# Patient Record
Sex: MALE | Race: WHITE | Employment: OTHER | URBAN - NONMETROPOLITAN AREA
[De-identification: names, ages, dates, MRNs, and addresses within clinical notes are randomized per-mention and may not be internally consistent; named-entity substitution may affect disease eponyms.]

---

## 2019-01-01 ENCOUNTER — TELEPHONE (OUTPATIENT)
Dept: CARDIOLOGY | Facility: CLINIC | Age: 79
End: 2019-01-01

## 2019-01-01 ENCOUNTER — OFFICE VISIT (OUTPATIENT)
Dept: CARDIOLOGY | Facility: CLINIC | Age: 79
End: 2019-01-01

## 2019-01-01 VITALS
HEART RATE: 63 BPM | WEIGHT: 216.8 LBS | BODY MASS INDEX: 32.11 KG/M2 | HEIGHT: 69 IN | DIASTOLIC BLOOD PRESSURE: 90 MMHG | OXYGEN SATURATION: 96 % | SYSTOLIC BLOOD PRESSURE: 164 MMHG

## 2019-01-01 DIAGNOSIS — I48.0 PAROXYSMAL ATRIAL FIBRILLATION (HCC): Primary | Chronic | ICD-10-CM

## 2019-01-01 DIAGNOSIS — I10 ESSENTIAL HYPERTENSION: Chronic | ICD-10-CM

## 2019-01-01 DIAGNOSIS — F17.201 SEVERE TOBACCO USE DISORDER, IN EARLY REMISSION: Chronic | ICD-10-CM

## 2019-01-01 PROCEDURE — 99214 OFFICE O/P EST MOD 30 MIN: CPT | Performed by: NURSE PRACTITIONER

## 2019-04-14 ENCOUNTER — OUTSIDE FACILITY SERVICE (OUTPATIENT)
Dept: CARDIOLOGY | Facility: CLINIC | Age: 79
End: 2019-04-14

## 2019-04-14 PROCEDURE — 93306 TTE W/DOPPLER COMPLETE: CPT | Performed by: INTERNAL MEDICINE

## 2019-04-15 ENCOUNTER — OUTSIDE FACILITY SERVICE (OUTPATIENT)
Dept: CARDIOLOGY | Facility: CLINIC | Age: 79
End: 2019-04-15

## 2019-04-15 PROCEDURE — 99233 SBSQ HOSP IP/OBS HIGH 50: CPT | Performed by: NURSE PRACTITIONER

## 2019-04-15 PROCEDURE — 93306 TTE W/DOPPLER COMPLETE: CPT | Performed by: INTERNAL MEDICINE

## 2019-04-17 DIAGNOSIS — I48.0 PAROXYSMAL ATRIAL FIBRILLATION (HCC): ICD-10-CM

## 2019-04-17 DIAGNOSIS — I50.33 ACUTE ON CHRONIC DIASTOLIC CONGESTIVE HEART FAILURE (HCC): ICD-10-CM

## 2019-04-17 DIAGNOSIS — I20.8 OTHER FORMS OF ANGINA PECTORIS (HCC): Primary | ICD-10-CM

## 2019-04-18 ENCOUNTER — TELEPHONE (OUTPATIENT)
Dept: CARDIOLOGY | Facility: CLINIC | Age: 79
End: 2019-04-18

## 2019-04-18 NOTE — TELEPHONE ENCOUNTER
Does he have any other readings? Clarify current home medications with patient and get a copy of discharge summary if available. Continue to monitor BP. If continues to run high will adjust medications. TX!

## 2019-04-18 NOTE — TELEPHONE ENCOUNTER
I told pt to keep a log and call and let me know how its running periodically.  He agreed.  I'm scanning the dc summary now, I went over the meds that were listed on the summary with him and he said that list is correct.

## 2019-05-01 ENCOUNTER — OUTSIDE FACILITY SERVICE (OUTPATIENT)
Dept: CARDIOLOGY | Facility: CLINIC | Age: 79
End: 2019-05-01

## 2019-05-01 PROCEDURE — 93018 CV STRESS TEST I&R ONLY: CPT | Performed by: INTERNAL MEDICINE

## 2019-05-01 PROCEDURE — 78452 HT MUSCLE IMAGE SPECT MULT: CPT | Performed by: INTERNAL MEDICINE

## 2019-05-03 DIAGNOSIS — I20.8 OTHER FORMS OF ANGINA PECTORIS (HCC): ICD-10-CM

## 2019-05-03 DIAGNOSIS — I50.33 ACUTE ON CHRONIC DIASTOLIC CONGESTIVE HEART FAILURE (HCC): ICD-10-CM

## 2019-05-03 DIAGNOSIS — I48.0 PAROXYSMAL ATRIAL FIBRILLATION (HCC): ICD-10-CM

## 2019-05-06 NOTE — PROGRESS NOTES
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 If not having any further chest discomfort, continue medical treatment. If continues to have chest discomfort, will adjust medications/consider a cardiac catheterization.

## 2019-05-13 ENCOUNTER — OFFICE VISIT (OUTPATIENT)
Dept: CARDIOLOGY | Facility: CLINIC | Age: 79
End: 2019-05-13

## 2019-05-13 VITALS
BODY MASS INDEX: 32.05 KG/M2 | OXYGEN SATURATION: 96 % | SYSTOLIC BLOOD PRESSURE: 118 MMHG | HEIGHT: 69 IN | HEART RATE: 58 BPM | DIASTOLIC BLOOD PRESSURE: 80 MMHG | WEIGHT: 216.4 LBS

## 2019-05-13 DIAGNOSIS — R94.39 ABNORMAL NUCLEAR STRESS TEST: Chronic | ICD-10-CM

## 2019-05-13 DIAGNOSIS — I10 ESSENTIAL HYPERTENSION: Chronic | ICD-10-CM

## 2019-05-13 DIAGNOSIS — Z59.9 FINANCIAL DIFFICULTIES: ICD-10-CM

## 2019-05-13 DIAGNOSIS — Z72.0 TOBACCO ABUSE: Chronic | ICD-10-CM

## 2019-05-13 DIAGNOSIS — I48.0 PAROXYSMAL ATRIAL FIBRILLATION (HCC): Primary | Chronic | ICD-10-CM

## 2019-05-13 PROBLEM — I48.91 ATRIAL FIBRILLATION (HCC): Chronic | Status: ACTIVE | Noted: 2019-05-13

## 2019-05-13 PROCEDURE — 93000 ELECTROCARDIOGRAM COMPLETE: CPT | Performed by: NURSE PRACTITIONER

## 2019-05-13 PROCEDURE — 99214 OFFICE O/P EST MOD 30 MIN: CPT | Performed by: NURSE PRACTITIONER

## 2019-05-13 RX ORDER — BENAZEPRIL HYDROCHLORIDE 10 MG/1
20 TABLET ORAL 2 TIMES DAILY
COMMUNITY

## 2019-05-13 RX ORDER — ESCITALOPRAM OXALATE 20 MG/1
20 TABLET ORAL DAILY
COMMUNITY

## 2019-05-13 RX ORDER — BUDESONIDE AND FORMOTEROL FUMARATE DIHYDRATE 160; 4.5 UG/1; UG/1
2 AEROSOL RESPIRATORY (INHALATION)
COMMUNITY

## 2019-05-13 RX ORDER — DILTIAZEM HYDROCHLORIDE 240 MG/1
240 CAPSULE, COATED, EXTENDED RELEASE ORAL DAILY
COMMUNITY
End: 2019-06-04 | Stop reason: SDUPTHER

## 2019-05-13 RX ORDER — VENLAFAXINE HYDROCHLORIDE 150 MG/1
150 CAPSULE, EXTENDED RELEASE ORAL DAILY
COMMUNITY

## 2019-05-13 SDOH — ECONOMIC STABILITY - INCOME SECURITY: PROBLEM RELATED TO HOUSING AND ECONOMIC CIRCUMSTANCES, UNSPECIFIED: Z59.9

## 2019-05-13 NOTE — ASSESSMENT & PLAN NOTE
Back in afib. Rate not controlled with exertion. Having constipation with diltiazem. Anticoagulated with Xarelto since 4/15/19. Will schedule cardioversion and discuss medications with Dr. Madrid.

## 2019-05-13 NOTE — PROGRESS NOTES
Subjective:     Encounter Date:05/13/2019    Chief Complaint:    Patient ID: Jim Villela is a 78 y.o. male here today for 2 week hospital cardiac follow-up.    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 (History)        History:   Past Medical History:   Diagnosis Date   • Asthma    • Atrial fibrillation (CMS/HCC)    • COPD (chronic obstructive pulmonary disease) (CMS/HCC)    • Hypertension      Past Surgical History:   Procedure Laterality Date   • HERNIA REPAIR       Social History     Socioeconomic History   • Marital status:      Spouse name: Not on file   • Number of children: Not on file   • Years of education: Not on file   • Highest education level: Not on file   Tobacco Use   • Smoking status: Current Every Day Smoker     Packs/day: 0.25     Years: 62.00     Pack years: 15.50     Types: Cigarettes     Start date: 1957   • Smokeless tobacco: Never Used   • Tobacco comment: has cut back, has never quit, up to 2 to 2.5 ppd, averaged 1 to 1.5 ppd   Substance and Sexual Activity   • Alcohol use: No     Frequency: Never   • Drug use: No   • Sexual activity: Defer     History reviewed. No pertinent family history.    Outpatient Medications Marked as Taking for the 5/13/19 encounter (Office Visit) with Huong Romero APRN   Medication Sig Dispense Refill   • benazepril (LOTENSIN) 10 MG tablet Take 10 mg by mouth Daily.  "    • budesonide-formoterol (SYMBICORT) 160-4.5 MCG/ACT inhaler Inhale 2 puffs 2 (Two) Times a Day.     • diltiaZEM CD (CARDIZEM CD) 240 MG 24 hr capsule Take 240 mg by mouth Daily.     • escitalopram (LEXAPRO) 20 MG tablet Take 20 mg by mouth Daily.     • rivaroxaban (XARELTO) 20 MG tablet Take 20 mg by mouth Daily.     • tiotropium (SPIRIVA) 18 MCG per inhalation capsule Place 1 capsule into inhaler and inhale Daily.     • venlafaxine XR (EFFEXOR-XR) 150 MG 24 hr capsule Take 150 mg by mouth Daily.       Review of Systems:  Review of Systems   Constitution: Positive for malaise/fatigue. Negative for chills, decreased appetite, fever and weakness.   HENT: Positive for congestion. Negative for nosebleeds.    Eyes: Negative for blurred vision and double vision.   Cardiovascular: Positive for dyspnea on exertion, leg swelling (chronic, hasn't worsened) and palpitations (feels like a skipped beat - has had his whole life, didn't know when he was in afib). Negative for chest pain, irregular heartbeat, near-syncope and syncope.   Respiratory: Positive for cough, shortness of breath and sputum production (white).    Hematologic/Lymphatic: Does not bruise/bleed easily.   Skin: Negative for dry skin, itching and rash.   Musculoskeletal: Positive for arthritis, joint pain and neck pain. Negative for back pain, falls, joint swelling, muscle cramps and muscle weakness.   Gastrointestinal: Positive for constipation (since starting diltiazem). Negative for abdominal pain, diarrhea, nausea and vomiting.   Neurological: Positive for loss of balance. Negative for dizziness, headaches and light-headedness.   Psychiatric/Behavioral: Positive for depression. The patient does not have insomnia and is not nervous/anxious.           Objective:   /80 (BP Location: Left arm, Patient Position: Sitting, Cuff Size: Adult)   Pulse 58   Ht 175.3 cm (69\")   Wt 98.2 kg (216 lb 6.4 oz)   SpO2 96%   BMI 31.96 kg/m²   Wt Readings from " Last 3 Encounters:   05/13/19 98.2 kg (216 lb 6.4 oz)       Physical Exam   Constitutional: He is oriented to person, place, and time. He appears well-developed and well-nourished.   Eyes: No scleral icterus.   Neck: No JVD present.   Cardiovascular: Normal rate, normal heart sounds and intact distal pulses. An irregularly irregular rhythm present. Exam reveals no gallop and no friction rub.   No murmur heard.  Pulmonary/Chest: Effort normal. He has decreased breath sounds. He has wheezes in the right upper field. He has no rales.   Barrel chested - increased AP diameter   Musculoskeletal: He exhibits no edema.   Neurological: He is alert and oriented to person, place, and time.   Skin: Skin is warm and dry.   Psychiatric: He has a normal mood and affect.   Vitals reviewed.    Lab/Diagnostics Review:   5/3/2019 Lexiscan sestamibi fixed inferior defect possible old with no definite evidence of with mild hypokinesis of the inferior and inferolateral wall.    4/16/2019 lipid panel total cholesterol 164 triglycerides 120 HDL 33     4/15/2019 echocardiogram EF 55 to 60%, grade 1 diastolic dysfunction, normal RV size and function, no significant valvular dysfunction    4/15/2019 EKG sinus rhythm sinus arrhythmia with short CO, left anterior fascicular block,  ms    4/15/2019  CBC WBC 10,400 hemoglobin 14.7 hematocrit 46% platelets 211,000  BMP sodium 141 potassium 4.2 chloride 108 CO2 22.8 BUN 23 creatinine 1.46 calcium 8.4 glucose 112  TSH 4.427      ECG 12 Lead  Date/Time: 5/13/2019 11:01 AM  Performed by: Huong Romero APRN  Authorized by: Huong Romero APRN   Comparison: compared with previous ECG from 4/15/2019  Comparison to previous ECG: Atrial fibrillation with RVR has replaced sinus rhythm with sinus arrhythmia  Rhythm: atrial fibrillation  Rate: tachycardic  BPM: 144  Q waves: V4 and V5    QRS axis: left  Other findings comments: RSR' or QR pattern in V1 suggestive of  RVCD    Clinical impression: abnormal EKG                Assessment/Plan:         Problem List Items Addressed This Visit        Cardiovascular and Mediastinum    Paroxysmal atrial fibrillation (CMS/HCC) - Primary (Chronic)    Current Assessment & Plan     Back in afib. Rate not controlled with exertion. Having constipation with diltiazem. Anticoagulated with Xarelto since 4/15/19. Will schedule cardioversion and discuss medications with Dr. Madrid.           Relevant Medications    diltiaZEM CD (CARDIZEM CD) 240 MG 24 hr capsule    Other Relevant Orders    ECG 12 Lead    Hypertension (Chronic)    Current Assessment & Plan     Well controlled with medications but having constipation with diltiazem.          Relevant Medications    diltiaZEM CD (CARDIZEM CD) 240 MG 24 hr capsule    benazepril (LOTENSIN) 10 MG tablet    Abnormal nuclear stress test (Chronic)    Overview     Fixed inferior defect and inferior and inferolateral hypokinesis per 5/1/19 Lexiscan [facility].         Current Assessment & Plan     Stable without angina. On Xarelto for afib. Fair lipid control per 4/2019 labs. Add statin. Treat medically.            Other    Tobacco abuse (Chronic)    Current Assessment & Plan     Encouraged smoking cessation. Has cut back to 1/4 ppd.          Financial difficulties    Current Assessment & Plan     Encouraged him to apply for food stamps, etc. He does not know how to apply. He is ok with his grandson helping him apply and check into other resources. His daughter lives in [place]. He eats only one meal a day at a local soup kitchen.              Return in about 3 months (around 8/13/2019) for Recheck.           Huong Romero, APRN, ACNP-BC, CHFN-BC ;

## 2019-05-13 NOTE — ASSESSMENT & PLAN NOTE
Encouraged him to apply for food stamps, etc. He does not know how to apply. He is ok with his grandson helping him apply and check into other resources. His daughter lives in [place]. He eats only one meal a day at a local soup kitchen.

## 2019-05-13 NOTE — ASSESSMENT & PLAN NOTE
Stable without angina. On Xarelto for afib. Fair lipid control per 4/2019 labs. Add statin. Treat medically.

## 2019-05-14 ENCOUNTER — TELEPHONE (OUTPATIENT)
Dept: CARDIOLOGY | Facility: CLINIC | Age: 79
End: 2019-05-14

## 2019-05-14 NOTE — TELEPHONE ENCOUNTER
Pt daughter Alexa called to speak with you regarding pt's appt yesterday.  She is on his CHIQUI for you to speak with also, Amirah Lynne, just goes by Alexa.

## 2019-05-16 DIAGNOSIS — I48.0 PAROXYSMAL ATRIAL FIBRILLATION (HCC): Primary | Chronic | ICD-10-CM

## 2019-05-16 RX ORDER — AMIODARONE HYDROCHLORIDE 200 MG/1
200 TABLET ORAL DAILY
Qty: 30 TABLET | Refills: 11 | Status: SHIPPED | OUTPATIENT
Start: 2019-05-16 | End: 2020-01-01

## 2019-05-16 NOTE — TELEPHONE ENCOUNTER
CALLED PT INFORMED HIM OF THE NEW MEDICATION AND THAT HE NEEDED TO COME TO OFFICE IN 2 WEEKS TO HAVE AN EKG DONE PT VOICED UNDERSTANDING

## 2019-05-16 NOTE — TELEPHONE ENCOUNTER
Returned daughter's phone call. Pt went to apply for assistance yesterday, and she is sending him [employer] gift cards for groceries. She will be out of school in June and will be able to come down to provide transportation if he requires cardioversion. I spoke to the  at the hospital this morning, and she recommended he go to the [facility] too.     Please notify patient to start amiodarone 200 mg daily (RX sent) in addition to his other medications and come by the office in 2 weeks for an EKG only to see if needs cardioversion. We will need to check labs (TSH, HFP) and PFTs yearly to make sure he tolerates amiodarone.     Daughter asked that we call her with any problems or concerns. She lives in [place] but he is estranged from family who live here. TX!

## 2019-05-28 ENCOUNTER — TELEPHONE (OUTPATIENT)
Dept: CARDIOLOGY | Facility: CLINIC | Age: 79
End: 2019-05-28

## 2019-05-28 ENCOUNTER — CLINICAL SUPPORT (OUTPATIENT)
Dept: CARDIOLOGY | Facility: CLINIC | Age: 79
End: 2019-05-28

## 2019-05-28 DIAGNOSIS — I48.0 PAROXYSMAL ATRIAL FIBRILLATION (HCC): Primary | Chronic | ICD-10-CM

## 2019-05-28 PROCEDURE — 93000 ELECTROCARDIOGRAM COMPLETE: CPT | Performed by: NURSE PRACTITIONER

## 2019-05-28 NOTE — TELEPHONE ENCOUNTER
Patient in office this morning for EKG only.    Stated that over the weekend he had one episode where he got very short of breath.  Only lasted for a few minutes and went away.  Just wanted you to see EKG and see what you suggested he do if anything.  No further episodes.  In office:  /78  HR 86  O2 94%

## 2019-05-28 NOTE — TELEPHONE ENCOUNTER
Let him know that he is back in sinus rhythm and does not need a cardioversion at this time. Not sure why he had the short episode of SOA over the weekend. Have him continue present therapy and call if worsens. TX!

## 2019-05-28 NOTE — PROGRESS NOTES
ECG 12 Lead  Date/Time: 5/28/2019 10:51 AM  Performed by: Huong Romero APRN  Authorized by: Huong Romero APRN   Comparison: compared with previous ECG from 5/13/2019  Comparison to previous ECG: Sinus has replaced atrial fibrillation and HR has decreased from 144 bpm, possible previous anterior infarct previously cited  Rhythm: sinus rhythm  Rate: normal  BPM: 86  Conduction: incomplete right bundle branch block  QRS axis: left    Clinical impression: abnormal EKG

## 2019-06-02 ENCOUNTER — OUTSIDE FACILITY SERVICE (OUTPATIENT)
Dept: CARDIOLOGY | Facility: CLINIC | Age: 79
End: 2019-06-02

## 2019-06-02 PROCEDURE — 93010 ELECTROCARDIOGRAM REPORT: CPT | Performed by: INTERNAL MEDICINE

## 2019-06-03 ENCOUNTER — OUTSIDE FACILITY SERVICE (OUTPATIENT)
Dept: CARDIOLOGY | Facility: CLINIC | Age: 79
End: 2019-06-03

## 2019-06-03 PROCEDURE — 99222 1ST HOSP IP/OBS MODERATE 55: CPT | Performed by: INTERNAL MEDICINE

## 2019-06-04 ENCOUNTER — TELEPHONE (OUTPATIENT)
Dept: CARDIOLOGY | Facility: CLINIC | Age: 79
End: 2019-06-04

## 2019-06-04 DIAGNOSIS — I48.0 PAROXYSMAL ATRIAL FIBRILLATION (HCC): Primary | ICD-10-CM

## 2019-06-04 RX ORDER — DILTIAZEM HYDROCHLORIDE 360 MG/1
360 CAPSULE, EXTENDED RELEASE ORAL DAILY
Qty: 30 CAPSULE | Refills: 11 | Status: SHIPPED | OUTPATIENT
Start: 2019-06-04 | End: 2020-01-01 | Stop reason: SDUPTHER

## 2019-06-04 NOTE — TELEPHONE ENCOUNTER
Yes, I increased his diltiazem to 360 mg. I remembered his script this morning. Just sent script. Have him keep his 6/17/19 follow-up. Referred to EP for possible ablation. TX!

## 2019-06-04 NOTE — TELEPHONE ENCOUNTER
Did you change his meds while in [facility]?  He said that you had changed something and were going to send in to [facility] for him, but [facility] told him that they do not have anything.

## 2019-06-17 ENCOUNTER — OFFICE VISIT (OUTPATIENT)
Dept: CARDIOLOGY | Facility: CLINIC | Age: 79
End: 2019-06-17

## 2019-06-17 ENCOUNTER — TELEPHONE (OUTPATIENT)
Dept: CARDIOLOGY | Facility: CLINIC | Age: 79
End: 2019-06-17

## 2019-06-17 VITALS
HEART RATE: 86 BPM | BODY MASS INDEX: 32.38 KG/M2 | DIASTOLIC BLOOD PRESSURE: 92 MMHG | HEIGHT: 69 IN | SYSTOLIC BLOOD PRESSURE: 148 MMHG | WEIGHT: 218.6 LBS | OXYGEN SATURATION: 95 %

## 2019-06-17 DIAGNOSIS — I10 ESSENTIAL HYPERTENSION: Chronic | ICD-10-CM

## 2019-06-17 DIAGNOSIS — Z59.9 FINANCIAL DIFFICULTIES: ICD-10-CM

## 2019-06-17 DIAGNOSIS — I48.0 PAROXYSMAL ATRIAL FIBRILLATION (HCC): Primary | Chronic | ICD-10-CM

## 2019-06-17 DIAGNOSIS — Z72.0 TOBACCO ABUSE: Chronic | ICD-10-CM

## 2019-06-17 DIAGNOSIS — R94.39 ABNORMAL NUCLEAR STRESS TEST: Chronic | ICD-10-CM

## 2019-06-17 PROCEDURE — 99214 OFFICE O/P EST MOD 30 MIN: CPT | Performed by: NURSE PRACTITIONER

## 2019-06-17 PROCEDURE — 93000 ELECTROCARDIOGRAM COMPLETE: CPT | Performed by: NURSE PRACTITIONER

## 2019-06-17 RX ORDER — BISACODYL 5 MG/1
5 TABLET, DELAYED RELEASE ORAL DAILY PRN
COMMUNITY

## 2019-06-17 SDOH — ECONOMIC STABILITY - INCOME SECURITY: PROBLEM RELATED TO HOUSING AND ECONOMIC CIRCUMSTANCES, UNSPECIFIED: Z59.9

## 2019-06-17 NOTE — ASSESSMENT & PLAN NOTE
Restart anticoagulation as soon as possible. Awaiting evaluation by Dr. Frias. Keep appt to see Dr. Watters 8/22/2019. Continue amiodarone and diltiazem for now but monitor QTc. Call if any dizziness, near syncope, or syncope.

## 2019-06-17 NOTE — PROGRESS NOTES
Subjective:     Encounter Date:06/17/2019    Chief Complaint:    Patient ID: Jim Villela is a 79 y.o. male here today for cardiac follow-up.    HPI     Atrial Fibrillation      Additional comments: 2 week [facility] hosp f/u with rapid ventricular response, diltiazem increased, feels better. Was having severe constipation and blood in his stool. Dr. Soto stopped Xarelto (he thinks last Monday). Seeing Dr. Frias 6/25/19 to see if needs colonoscopy. Has an appointment to see Dr. Watters 8/22/2019.               Hypertension      Additional comments: high for years          Last edited by Huong Romero APRN on 6/17/2019  2:36 PM. (History)        History:   Past Medical History:   Diagnosis Date   • Asthma    • Atrial fibrillation (CMS/HCC)    • COPD (chronic obstructive pulmonary disease) (CMS/HCC)    • Hypertension    • Melena      Past Surgical History:   Procedure Laterality Date   • HERNIA REPAIR       Social History     Socioeconomic History   • Marital status:      Spouse name: Not on file   • Number of children: Not on file   • Years of education: Not on file   • Highest education level: Not on file   Tobacco Use   • Smoking status: Current Every Day Smoker     Packs/day: 2.00     Years: 62.00     Pack years: 124.00     Types: Cigarettes     Start date: 1957   • Smokeless tobacco: Never Used   • Tobacco comment: has cut back to 1/4-1/2 ppd, has never quit, up to 2 to 2.5 ppd, averaged 1 to 1.5 ppd   Substance and Sexual Activity   • Alcohol use: No     Frequency: Never   • Drug use: No   • Sexual activity: Defer     History reviewed. No pertinent family history.    Outpatient Medications Marked as Taking for the 6/17/19 encounter (Office Visit) with Huong Romero APRN   Medication Sig Dispense Refill   • amiodarone (PACERONE) 200 MG tablet Take 1 tablet by mouth Daily. 30 tablet 11   • benazepril (LOTENSIN) 10 MG tablet Take 10 mg by mouth Daily.     • bisacodyl (DULCOLAX) 5 MG EC tablet  Take 5 mg by mouth Daily As Needed for Constipation.     • budesonide-formoterol (SYMBICORT) 160-4.5 MCG/ACT inhaler Inhale 2 puffs 2 (Two) Times a Day.     • diltiaZEM CD (CARDIZEM CD) 360 MG 24 hr capsule Take 1 capsule by mouth Daily. 30 capsule 11   • escitalopram (LEXAPRO) 20 MG tablet Take 20 mg by mouth Daily.     • tiotropium (SPIRIVA) 18 MCG per inhalation capsule Place 1 capsule into inhaler and inhale Daily.     • venlafaxine XR (EFFEXOR-XR) 150 MG 24 hr capsule Take 150 mg by mouth Daily.     Didn't bring medications or list today. Called back to confirm he is taking benazepril 10 mg daily for BP.     Review of Systems:  Review of Systems   Constitution: Positive for malaise/fatigue. Negative for chills, decreased appetite, fever and weakness.   HENT: Positive for congestion and hearing loss. Negative for nosebleeds.    Eyes: Negative for blurred vision and double vision.   Cardiovascular: Positive for dyspnea on exertion, leg swelling (chronic, hasn't worsened) and palpitations (feels like a skipped beat - has had his whole life, didn't know when he was in afib). Negative for chest pain, irregular heartbeat, near-syncope and syncope.   Respiratory: Positive for cough, shortness of breath and sputum production (white).    Hematologic/Lymphatic: Does not bruise/bleed easily.   Skin: Negative for dry skin, itching and rash.   Musculoskeletal: Positive for arthritis, joint pain and neck pain. Negative for back pain, falls, joint swelling, muscle cramps and muscle weakness.   Gastrointestinal: Positive for constipation (since starting diltiazem) and melena. Negative for abdominal pain, diarrhea, heartburn, nausea and vomiting.   Neurological: Positive for loss of balance. Negative for dizziness, headaches and light-headedness.   Psychiatric/Behavioral: Positive for depression. The patient does not have insomnia and is not nervous/anxious.           Objective:   /92 (BP Location: Left arm, Patient  "Position: Sitting, Cuff Size: Adult)   Pulse 86   Ht 175.3 cm (69\")   Wt 99.2 kg (218 lb 9.6 oz)   SpO2 95%   BMI 32.28 kg/m²   Wt Readings from Last 3 Encounters:   06/17/19 99.2 kg (218 lb 9.6 oz)   05/13/19 98.2 kg (216 lb 6.4 oz)       Physical Exam   Constitutional: He is oriented to person, place, and time. He appears well-developed and well-nourished.   Eyes: No scleral icterus.   Neck: No JVD present.   Cardiovascular: Normal rate, normal heart sounds and intact distal pulses. An irregularly irregular rhythm present. Exam reveals no gallop and no friction rub.   No murmur heard.  Pulmonary/Chest: Effort normal. He has decreased breath sounds. He has wheezes (cleared with coughing) in the right upper field. He has no rales.   Barrel chested - increased AP diameter   Musculoskeletal: He exhibits no edema.   Neurological: He is alert and oriented to person, place, and time.   Skin: Skin is warm and dry.   Psychiatric: He has a normal mood and affect.   Vitals reviewed.    Lab/Diagnostics Review:   6/3/2019   CBC WBC 11,300 hemoglobin 13.7 hematocrit 43.6% platelets 229,000   BMP sodium 141 potassium 4.7 chloride 106 CO2 31.5 BUN 27 creatinine 1.76 calcium 9.1 glucose 108  Troponin less than 0.017    6/2/2019 EKG atrial fibrillation 175 bpm, incomplete right bundle branch block, left anterior fascicular block, significantly changed from previous EKG as read by Dr. Mercy ORDAZ      5/3/2019 Lexiscan sestamibi fixed inferior defect possible old with no definite evidence of with mild hypokinesis of the inferior and inferolateral wall.     4/16/2019 lipid panel total cholesterol 164 triglycerides 120 HDL 33      4/15/2019 echocardiogram EF 55 to 60%, grade 1 diastolic dysfunction, normal RV size and function, no significant valvular dysfunction     4/15/2019 EKG sinus rhythm sinus arrhythmia with short AL, left anterior fascicular block,  ms     4/15/2019  CBC WBC 10,400 hemoglobin 14.7 hematocrit " 46% platelets 211,000  BMP sodium 141 potassium 4.2 chloride 108 CO2 22.8 BUN 23 creatinine 1.46 calcium 8.4 glucose 112  TSH 4.427      ECG 12 Lead  Date/Time: 5/13/2019 11:01 AM  Performed by: Huong Romero APRN  Authorized by: Huong Romero APRN   Comparison: compared with previous ECG from 4/15/2019  Comparison to previous ECG: Atrial fibrillation with RVR has replaced sinus rhythm with sinus arrhythmia  Rhythm: atrial fibrillation  Rate: tachycardic  BPM: 144  Q waves: V4 and V5     QRS axis: left  Other findings comments: RSR' or QR pattern in V1 suggestive of RVCD    Clinical impression: abnormal EKG      ECG 12 Lead  Date/Time: 6/17/2019 2:38 PM  Performed by: Huong Romero APRN  Authorized by: Huong Romero APRN   Comparison: compared with previous ECG from 6/2/2019  Comparison to previous ECG: Sinus has replaced atrial fibrillation with RVR, QTc increased from 433 ms on 5/28/19  Rhythm: sinus rhythm  Ectopy: unifocal PVCs and infrequent PVCs  Rate: normal  BPM: 85  Conduction: incomplete right bundle branch block  Other findings: prolonged QTc interval  Other findings comments: 506 ms    Clinical impression: abnormal EKG                Assessment/Plan:         Problem List Items Addressed This Visit        Cardiovascular and Mediastinum    Paroxysmal atrial fibrillation (CMS/HCC) - Primary (Chronic)    Current Assessment & Plan     Restart anticoagulation as soon as possible. Awaiting evaluation by Dr. Frias. Keep appt to see Dr. Watters 8/22/2019. Continue amiodarone and diltiazem for now but monitor QTc. Call if any dizziness, near syncope, or syncope.           Relevant Orders    ECG 12 Lead    Hypertension (Chronic)    Current Assessment & Plan     Encouraged him to check BP at home regularly. Goal less 130/80. Try to avoid processed foods high in sodium. May need to increase benazepril.          Abnormal nuclear stress test (Chronic)    Overview     Fixed inferior defect  and inferior and inferolateral hypokinesis per 5/1/19 Lexiscan [facility].         Current Assessment & Plan     Continue medical management. Consider adding aspirin if Xarelto not restarted after melena evaluated.             Other    Tobacco abuse (Chronic)    Current Assessment & Plan     Encouraged smoking cessation. He has never been able to quit before.          Financial difficulties        Return in about 1 month (around 7/17/2019) for Recheck.           Huong Romero, APRN, ACNP-BC, CHFN-BC

## 2019-06-17 NOTE — ASSESSMENT & PLAN NOTE
Encouraged him to check BP at home regularly. Goal less 130/80. Try to avoid processed foods high in sodium. May need to increase benazepril.

## 2019-06-17 NOTE — ASSESSMENT & PLAN NOTE
Continue medical management. Consider adding aspirin if Xarelto not restarted after melena evaluated.

## 2019-06-24 ENCOUNTER — TELEPHONE (OUTPATIENT)
Dept: CARDIOLOGY | Facility: CLINIC | Age: 79
End: 2019-06-24

## 2019-06-24 NOTE — TELEPHONE ENCOUNTER
Pt called and stated his BP and HR readings are causing him some concern.    Saturday (6/22/2019) was /87 HR 56  Sunday (6/23/2019) was /81  HR 48  Today (6/24/2019)was /88  HR 47.    He would like to know if these are okay or if he needs to be checked out.      Thanks,  Eloy

## 2019-06-28 ENCOUNTER — TELEPHONE (OUTPATIENT)
Dept: CARDIOLOGY | Facility: CLINIC | Age: 79
End: 2019-06-28

## 2019-06-28 NOTE — TELEPHONE ENCOUNTER
----- Message from Huong Rajan PA-C sent at 6/25/2019 10:13 PM CDT -----  Please call and see if pt is having symptoms in regard to high bp or low HR.  Advise him to check twice daily.  Please let me know what he says.  Thanks!      Lt message for pt to call office back

## 2019-07-01 ENCOUNTER — TELEPHONE (OUTPATIENT)
Dept: CARDIOLOGY | Facility: CLINIC | Age: 79
End: 2019-07-01

## 2019-07-01 NOTE — TELEPHONE ENCOUNTER
CALLED SPOKE TO PT HE STATED HE HAS BEEN STAYING INSIDE AND IS NOT HAVING DIZZINESS AND SOB ONLY IF HE GETS OUT IN THE HEAT HE ALSO STATED HIS BP IS RUNNING HIGH

## 2019-07-01 NOTE — TELEPHONE ENCOUNTER
----- Message from Huong Rajan PA-C sent at 7/1/2019  1:29 PM CDT -----  Has he kept BP log?  If so, what has it been running?    CONTACTED PT HE STATED HIS READINGS WERE  06/26/2019   146/90  HEART RATE 52  06/27/2019  146/90  HEART RATE 53  136/78  HEART RATE 55  06/28/2019  157/92  HEART RATE 56  07/01/2019  152/98  HEART RATE 50

## 2019-07-01 NOTE — TELEPHONE ENCOUNTER
----- Message from Huong Rajan PA-C sent at 6/28/2019  6:06 PM CDT -----  Please call pt on Monday and if he is still having significant SOB and dizziness, he can schedule OV with JG or with PCP if needed to be seen sooner.      PT IS MAKING AN APPT WITH PCP

## 2019-07-02 ENCOUNTER — TELEPHONE (OUTPATIENT)
Dept: CARDIOLOGY | Facility: CLINIC | Age: 79
End: 2019-07-02

## 2019-07-02 NOTE — TELEPHONE ENCOUNTER
----- Message from Huong Rajan PA-C sent at 7/2/2019  2:54 PM CDT -----  Increase benazapril to 20 mg daily.  Continue to monitor BP twice daily and document.  Come to office to get EKG.  You can send to me for review.  Thanks!    CALLED INFORMED PT TO INCREASE HIS BENAZAPTIL TO 20 MG A DAY AND TO CONTINUE TO KEEP A BP DIARY AND TO COME INTO OFFICE AND HAVE AN EKG DONE PT VOICED UNDERSTANDING

## 2019-07-03 ENCOUNTER — TELEPHONE (OUTPATIENT)
Dept: CARDIOLOGY | Facility: CLINIC | Age: 79
End: 2019-07-03

## 2019-07-03 DIAGNOSIS — I49.1 ATRIAL ECTOPY: Primary | ICD-10-CM

## 2019-07-03 DIAGNOSIS — R42 DIZZINESS: ICD-10-CM

## 2019-07-03 RX ORDER — NEBIVOLOL 2.5 MG/1
2.5 TABLET ORAL DAILY
Qty: 30 TABLET | Refills: 11 | Status: SHIPPED | OUTPATIENT
Start: 2019-07-03 | End: 2019-07-12

## 2019-07-03 NOTE — TELEPHONE ENCOUNTER
----- Message from Huong Rajan PA-C sent at 7/3/2019  9:50 AM CDT -----  Start bystolic 2.5 mg qd.  Sent to pharmacy.  Pt should not take if HR <=50 bpm.  Continue to monitor BP and HR twice daily and document.  14 day zio patch.  Orders placed.  Thanks!      CALLED PT INFORMED HIM THAT BYSTOLIC WAS SENT TO PHARMACY FOR HIM TO START AND THAT IF HR WAS LES THAN 50 DO NOT TAKE ALSO HE NEEDED TO GET A HOLTER MONITOR PLACED HE WILL COME IN OFFICE TO HAVE THIS DONE

## 2019-07-09 ENCOUNTER — TELEPHONE (OUTPATIENT)
Dept: CARDIOLOGY | Facility: CLINIC | Age: 79
End: 2019-07-09

## 2019-07-09 DIAGNOSIS — I48.0 PAROXYSMAL ATRIAL FIBRILLATION (HCC): Primary | Chronic | ICD-10-CM

## 2019-07-12 ENCOUNTER — NURSE TRIAGE (OUTPATIENT)
Dept: CALL CENTER | Facility: HOSPITAL | Age: 79
End: 2019-07-12

## 2019-07-12 NOTE — TELEPHONE ENCOUNTER
"Caller states he is confused about what he should start taking. This nurse and another nurse went over with patient. He states he's confused about what he should be taking. We reviewed per his recent office visit note and we do believe we cleared it up for him. He was advised to contact office on Monday and confirm with what we are seeing in his recent note.     Reason for Disposition  • Caller has medication question only, adult not sick, and triager answers question    Additional Information  • Negative: Drug overdose and nurse unable to answer question  • Negative: Caller requesting information not related to medicine  • Negative: Caller requesting a prescription for Strep throat and has a positive culture result  • Negative: Rash while taking a medication or within 3 days of stopping it  • Negative: Immunization reaction suspected  • Negative: [1] Asthma and [2] having symptoms of asthma (cough, wheezing, etc)  • Negative: MORE THAN A DOUBLE DOSE of a prescription or over-the-counter (OTC) drug  • Negative: [1] DOUBLE DOSE (an extra dose or lesser amount) of over-the-counter (OTC) drug AND [2] any symptoms (e.g., dizziness, nausea, pain, sleepiness)  • Negative: [1] DOUBLE DOSE (an extra dose or lesser amount) of prescription drug AND [2] any symptoms (e.g., dizziness, nausea, pain, sleepiness)  • Negative: Took another person's prescription drug  • Negative: [1] DOUBLE DOSE (an extra dose or lesser amount) of prescription drug AND [2] NO symptoms (Exception: a double dose of antibiotics)  • Negative: Diabetes drug error or overdose (e.g., insulin or extra dose)  • Negative: [1] Request for URGENT new prescription or refill of \"essential\" medication (i.e., likelihood of harm to patient if not taken) AND [2] triager unable to fill per unit policy  • Negative: [1] Prescription not at pharmacy AND [2] was prescribed today by PCP  • Negative: Pharmacy calling with prescription questions and triager unable to answer " "question  • Negative: Caller has URGENT medication question about med that PCP prescribed and triager unable to answer question  • Negative: Caller has NON-URGENT medication question about med that PCP prescribed and triager unable to answer question  • Negative: Caller requesting a NON-URGENT new prescription or refill and triager unable to refill per unit policy  • Negative: Caller has medication question about med not prescribed by PCP and triager unable to answer question (e.g., compatibility with other med, storage)  • Negative: [1] DOUBLE DOSE (an extra dose or lesser amount) of over-the-counter (OTC) drug AND [2] NO symptoms  • Negative: [1] DOUBLE DOSE (an extra dose or lesser amount) of antibiotic drug AND [2] NO symptoms    Answer Assessment - Initial Assessment Questions  1. SYMPTOMS: \"Do you have any symptoms?\"      Denies   2. SEVERITY: If symptoms are present, ask \"Are they mild, moderate or severe?\"      Denies    Protocols used: MEDICATION QUESTION CALL-ADULT-      "

## 2019-07-12 NOTE — TELEPHONE ENCOUNTER
Ordered metoprolol tartrate 25 mg BID to replace Bystolic due to cost (may provide better rate control as well). Have pt call with any side effects or questions. Keep 7/23/19 f/u. TX!

## 2019-07-12 NOTE — TELEPHONE ENCOUNTER
I received the PA denial for  Bystolic 2.5 mg. Bystolic is not on the drug list (formulary). The plan does not cover this drug unless he has previously tried and failed drugs covered on formulary that are clinical alternatives. He must first try Atenolol, Bisoprolol, and Carvedilol. Please advise

## 2019-07-15 ENCOUNTER — TELEPHONE (OUTPATIENT)
Dept: CARDIOLOGY | Facility: CLINIC | Age: 79
End: 2019-07-15

## 2019-07-15 NOTE — TELEPHONE ENCOUNTER
Make sure he took metoprolol and benazepril. He was talking to [employer] and asking if he needed to stop benazepril after changing Bystolic to metoprolol due to formulary issues. How is he checking his HR? If with BP monitor it may not be picking up on his true rate due to afib. We can do a monitor if his rates remain variable. If BP this high with benazepril, we will need to adjust doses. TX!

## 2019-07-15 NOTE — TELEPHONE ENCOUNTER
PT CALLED AND STATED HE TOOK HIS BP THIS MORNING IT /98  HEART RATE 37  HE THEN TOOK BP AT 11 AM AND IT WAS STILL HIGH /107 HEART RATE WAS 63 NO HEADACHES OR DIZZINESS HE JUST FELT A LITTLE SCARED PLEASE ADVISE

## 2019-07-15 NOTE — TELEPHONE ENCOUNTER
Yes, he needs to take metoprolol with benazepril. The metoprolol was just to replace Bystolic. TX!

## 2019-07-17 NOTE — TELEPHONE ENCOUNTER
PT IS TAKING METOPROLOL 25MG  2 TIMES A DAY AND THE BENAZEPRIL 10MG 2 TIMES A DAY HE IS RETURNING THE ZIO PATCH TODAY HIS BP TODAY /106 HR 84

## 2019-07-17 NOTE — TELEPHONE ENCOUNTER
Have him increase benazepril to 40 mg per day. Call if any near syncope or heart rates less than 40. Go to ER if passes out. TX!

## 2019-07-19 ENCOUNTER — TELEPHONE (OUTPATIENT)
Dept: CARDIOLOGY | Facility: CLINIC | Age: 79
End: 2019-07-19

## 2019-07-19 NOTE — TELEPHONE ENCOUNTER
Is he more swollen, SOA, or had sudden weight gain? If so, he needs to double diuretic for the next 3 days. It can take up to 2 weeks to see maximum effect of increased BP meds. Keep anuja'd f/u for next week. TX!

## 2019-07-19 NOTE — TELEPHONE ENCOUNTER
Pt came in office and wanted to know what he needed to do he stated that he did the increase of benazepril to 40 mg for the last 2 days bp  has still been high at 160/101 and hr 60 pt stated he is real tired he stated he has been taking all medications as prescribed  also informed pt that we are waiting on holter results can you please advise

## 2019-07-22 ENCOUNTER — TELEPHONE (OUTPATIENT)
Dept: CARDIOLOGY | Facility: CLINIC | Age: 79
End: 2019-07-22

## 2019-07-22 NOTE — TELEPHONE ENCOUNTER
CALLED PT HE STATED HE IS NOT TAKING A DIURETIC AND HE DOES NOT FEEL WELL HIS BP THIS MORNING  ON TOP DID NOT KNOW BOTTOM NUMBER PT WILL KEEP APPT TOMM

## 2019-07-23 ENCOUNTER — TELEPHONE (OUTPATIENT)
Dept: CARDIOLOGY | Facility: CLINIC | Age: 79
End: 2019-07-23

## 2019-07-23 ENCOUNTER — OFFICE VISIT (OUTPATIENT)
Dept: CARDIOLOGY | Facility: CLINIC | Age: 79
End: 2019-07-23

## 2019-07-23 VITALS
DIASTOLIC BLOOD PRESSURE: 98 MMHG | WEIGHT: 216.2 LBS | BODY MASS INDEX: 32.02 KG/M2 | HEIGHT: 69 IN | HEART RATE: 79 BPM | SYSTOLIC BLOOD PRESSURE: 164 MMHG | OXYGEN SATURATION: 94 %

## 2019-07-23 DIAGNOSIS — F17.201 SEVERE TOBACCO USE DISORDER, IN EARLY REMISSION: Chronic | ICD-10-CM

## 2019-07-23 DIAGNOSIS — R40.0 UNCONTROLLED DAYTIME SOMNOLENCE: ICD-10-CM

## 2019-07-23 DIAGNOSIS — I10 ESSENTIAL HYPERTENSION: Primary | Chronic | ICD-10-CM

## 2019-07-23 DIAGNOSIS — R06.02 SHORTNESS OF BREATH: ICD-10-CM

## 2019-07-23 DIAGNOSIS — J41.0 SIMPLE CHRONIC BRONCHITIS (HCC): Chronic | ICD-10-CM

## 2019-07-23 DIAGNOSIS — R06.00 PND (PAROXYSMAL NOCTURNAL DYSPNEA): ICD-10-CM

## 2019-07-23 DIAGNOSIS — R07.89 CHEST PAIN, ATYPICAL: Chronic | ICD-10-CM

## 2019-07-23 DIAGNOSIS — Z91.89 AT RISK FOR OBSTRUCTIVE SLEEP APNEA: ICD-10-CM

## 2019-07-23 DIAGNOSIS — I48.0 PAROXYSMAL ATRIAL FIBRILLATION (HCC): Chronic | ICD-10-CM

## 2019-07-23 PROBLEM — J44.9 COPD (CHRONIC OBSTRUCTIVE PULMONARY DISEASE) (HCC): Chronic | Status: ACTIVE | Noted: 2019-07-23

## 2019-07-23 PROCEDURE — 99214 OFFICE O/P EST MOD 30 MIN: CPT | Performed by: NURSE PRACTITIONER

## 2019-07-23 NOTE — ASSESSMENT & PLAN NOTE
5/2019 nuclear stress with fixed inferior defect and mild inferior and inferolateral hypokinesis. Will consider cath if continues to have chest discomfort, especially if exertional.

## 2019-07-23 NOTE — ASSESSMENT & PLAN NOTE
Advised him and his friend to call with an accurate medication list as soon as returns home today. They have not called as of 6 pm. Will have staff call him tomorrow to confirm medications so I can make adjustments. He is eating high sodium frozen dinners. Encouraged him to get lowest sodium possible and try to avoid anything greater than 200 mg per serving. Encouraged him to gradually increase aerobic activity.

## 2019-07-23 NOTE — PROGRESS NOTES
Subjective:     Encounter Date:07/23/2019    Chief Complaint:    Patient ID: Jim Villela is a 79 y.o. male here today for 1 month cardiac follow-up. He is accompanied by a friend from [place]. He is a poor historian with poor social support from family. His only supportive daughter lives in [place].    HPI     Hypertension      Additional comments: went to [facility] ER yesterday afternoon with chest discomfort and elevated BP despite increased dose of benazepril - chest discomfort resolved and BP improved without treatment. Has previously been high for years but had been better controlled 5/2019. He does not his medications and did not bring them or an accurate list with him today. Huong Mohini tried to start him on Bystolic 2.5 mg daily a few weeks ago but insurance denied. Was supposed to start metoprolol but he doesn't know if he started or not.                Chest Pain      Additional comments: not with exertion, random - worse with anxiety or when BP really high              Atrial Fibrillation      Additional comments: paroxysmal, had an appt to see Dr. Watters in August but moved to September, back on Xarelto after being stopped for melena. On amiodarone. Mailed in [employer] Holter monitor yesterday. Reports having slow heart rates 40-50s at home.          Last edited by Huong Romero APRN on 7/23/2019  5:51 PM. (History)        History:   Past Medical History:   Diagnosis Date   • Asthma    • Atrial fibrillation (CMS/HCC)    • COPD (chronic obstructive pulmonary disease) (CMS/HCC)    • Hypertension    • Melena      Past Surgical History:   Procedure Laterality Date   • HERNIA REPAIR       Social History     Socioeconomic History   • Marital status:      Spouse name: Not on file   • Number of children: Not on file   • Years of education: Not on file   • Highest education level: Not on file   Tobacco Use   • Smoking status: Former Smoker     Packs/day: 2.00     Years: 62.00     Pack years: 124.00      Types: Cigarettes     Start date:      Last attempt to quit: 2019     Years since quittin.0   • Smokeless tobacco: Never Used   • Tobacco comment: has never quit before, smoked up to 2 to 2.5 ppd, averaged 1 to 1.5 ppd   Substance and Sexual Activity   • Alcohol use: No     Frequency: Never   • Drug use: No   • Sexual activity: Defer     History reviewed. No pertinent family history.    Outpatient Medications Marked as Taking for the 19 encounter (Office Visit) with Huong Romero APRN   Medication Sig Dispense Refill   • amiodarone (PACERONE) 200 MG tablet Take 1 tablet by mouth Daily. 30 tablet 11   • benazepril (LOTENSIN) 10 MG tablet Take 20 mg by mouth 2 (Two) Times a Day. 2 tab in q am  And  2 tab hs     • bisacodyl (DULCOLAX) 5 MG EC tablet Take 5 mg by mouth Daily As Needed for Constipation.     • budesonide-formoterol (SYMBICORT) 160-4.5 MCG/ACT inhaler Inhale 2 puffs 2 (Two) Times a Day.     • diltiaZEM CD (CARDIZEM CD) 360 MG 24 hr capsule Take 1 capsule by mouth Daily. 30 capsule 11   • metoprolol tartrate (LOPRESSOR) 25 MG tablet Take 1 tablet by mouth 2 (Two) Times a Day. 60 tablet 11   • tiotropium (SPIRIVA) 18 MCG per inhalation capsule Place 1 capsule into inhaler and inhale Daily.     • venlafaxine XR (EFFEXOR-XR) 150 MG 24 hr capsule Take 150 mg by mouth Daily.         Review of Systems:  Review of Systems   Constitution: Positive for weakness and malaise/fatigue. Negative for chills, decreased appetite and fever.   HENT: Positive for hearing loss. Negative for congestion and nosebleeds.    Eyes: Negative for blurred vision and double vision.   Cardiovascular: Positive for chest pain, dyspnea on exertion and palpitations (feels like a skipped beat - has had his whole life, didn't know when he was in afib). Negative for irregular heartbeat, leg swelling (improved), near-syncope and syncope.   Respiratory: Positive for cough and shortness of breath. Negative for sputum  "production (white).    Endocrine: Negative for cold intolerance and heat intolerance.   Hematologic/Lymphatic: Bruises/bleeds easily.   Skin: Negative for dry skin, itching and rash.   Musculoskeletal: Positive for arthritis and neck pain. Negative for back pain, falls, joint pain, joint swelling, muscle cramps and muscle weakness.   Gastrointestinal: Positive for constipation (since starting diltiazem). Negative for abdominal pain, diarrhea, heartburn, melena, nausea and vomiting.   Genitourinary: Positive for nocturia. Negative for dysuria and hematuria.   Neurological: Positive for excessive daytime sleepiness and light-headedness. Negative for dizziness, headaches and loss of balance.   Psychiatric/Behavioral: Positive for depression. The patient has insomnia and is nervous/anxious.           Objective:   /98 (BP Location: Left arm, Patient Position: Sitting, Cuff Size: Adult)   Pulse 79   Ht 175.3 cm (69\")   Wt 98.1 kg (216 lb 3.2 oz)   SpO2 94%   BMI 31.93 kg/m²   Wt Readings from Last 3 Encounters:   07/23/19 98.1 kg (216 lb 3.2 oz)   06/17/19 99.2 kg (218 lb 9.6 oz)   05/13/19 98.2 kg (216 lb 6.4 oz)     BP Readings from Last 3 Encounters:   07/23/19 164/98   06/17/19 148/92   05/13/19 118/80       Physical Exam   Constitutional: He is oriented to person, place, and time. He appears well-developed and well-nourished.   Eyes: No scleral icterus.   Neck: No JVD present.   Cardiovascular: Normal rate, normal heart sounds and intact distal pulses. An irregularly irregular rhythm present. Exam reveals no gallop and no friction rub.   No murmur heard.  Pulmonary/Chest: Effort normal. He has decreased breath sounds. He has wheezes (cleared with coughing) in the right upper field. He has no rales.   Barrel chested - increased AP diameter   Musculoskeletal: He exhibits no edema.   Neurological: He is alert and oriented to person, place, and time.   Skin: Skin is warm and dry.   Psychiatric: He has a normal " mood and affect.   Vitals reviewed.      Lab/Diagnostics Review:   6/3/2019   CBC WBC 11,300 hemoglobin 13.7 hematocrit 43.6% platelets 229,000   BMP sodium 141 potassium 4.7 chloride 106 CO2 31.5 BUN 27 creatinine 1.76 calcium 9.1 glucose 108  Troponin less than 0.017     6/2/2019 EKG atrial fibrillation 175 bpm, incomplete right bundle branch block, left anterior fascicular block, significantly changed from previous EKG as read by Dr. Mercy ORDAZ      5/3/2019 Lexiscan sestamibi fixed inferior defect possible old with no definite evidence of with mild hypokinesis of the inferior and inferolateral wall.     4/16/2019 lipid panel total cholesterol 164 triglycerides 120 HDL 33      4/15/2019 echocardiogram EF 55 to 60%, grade 1 diastolic dysfunction, normal RV size and function, no significant valvular dysfunction     4/15/2019 EKG sinus rhythm sinus arrhythmia with short OK, left anterior fascicular block,  ms     4/15/2019  CBC WBC 10,400 hemoglobin 14.7 hematocrit 46% platelets 211,000  BMP sodium 141 potassium 4.2 chloride 108 CO2 22.8 BUN 23 creatinine 1.46 calcium 8.4 glucose 112  TSH 4.427      ECG 12 Lead  Date/Time: 5/13/2019 11:01 AM  Performed by: Huong Romero APRN  Authorized by: Huong Romero APRN   Comparison: compared with previous ECG from 4/15/2019  Comparison to previous ECG: Atrial fibrillation with RVR has replaced sinus rhythm with sinus arrhythmia  Rhythm: atrial fibrillation  Rate: tachycardic  BPM: 144  Q waves: V4 and V5     QRS axis: left  Other findings comments: RSR' or QR pattern in V1 suggestive of RVCD    Clinical impression: abnormal EKG        ECG 12 Lead  Date/Time: 6/17/2019 2:38 PM  Performed by: Huong Romero APRN  Authorized by: Huong Romero APRN   Comparison: compared with previous ECG from 6/2/2019  Comparison to previous ECG: Sinus has replaced atrial fibrillation with RVR, QTc increased from 433 ms on 5/28/19  Rhythm: sinus  rhythm  Ectopy: unifocal PVCs and infrequent PVCs  Rate: normal  BPM: 85  Conduction: incomplete right bundle branch block  Other findings: prolonged QTc interval  Other findings comments: 506 ms    Clinical impression: abnormal EKG    Procedures: none in office today        Assessment/Plan:         Problem List Items Addressed This Visit        Cardiovascular and Mediastinum    Paroxysmal atrial fibrillation (CMS/HCC) (Chronic)    Current Assessment & Plan     Paroxysmal, in sinus rhythm at [facility] ER yesterday per EKG. Zio extended Holter pending review. Anticoagulated with Xarelto. On amiodarone. Scheduled to see Dr. Watters for possible ablation 9/2019.          Relevant Orders    Overnight Sleep Oximetry Study    Hypertension - Primary (Chronic)    Current Assessment & Plan     Advised him and his friend to call with an accurate medication list as soon as returns home today. They have not called as of 6 pm. Will have staff call him tomorrow to confirm medications so I can make adjustments. He is eating high sodium frozen dinners. Encouraged him to get lowest sodium possible and try to avoid anything greater than 200 mg per serving. Encouraged him to gradually increase aerobic activity.          Relevant Orders    Overnight Sleep Oximetry Study       Respiratory    COPD (chronic obstructive pulmonary disease) (CMS/HCC) (Chronic)    Current Assessment & Plan                  Relevant Orders    Overnight Sleep Oximetry Study       Nervous and Auditory    Chest pain, atypical (Chronic)    Current Assessment & Plan     5/2019 nuclear stress with fixed inferior defect and mild inferior and inferolateral hypokinesis. Will consider cath if continues to have chest discomfort, especially if exertional.             Other    Severe tobacco use disorder, in early remission (Chronic)    Current Assessment & Plan     Encouraged continued cessation. Congratulated him on quitting.            Other Visit Diagnoses     At risk for  obstructive sleep apnea        Relevant Orders    Overnight Sleep Oximetry Study    Shortness of breath        Relevant Orders    Overnight Sleep Oximetry Study    PND (paroxysmal nocturnal dyspnea)        Relevant Orders    Overnight Sleep Oximetry Study    Uncontrolled daytime somnolence        Relevant Orders    Overnight Sleep Oximetry Study        Return in about 1 month (around 8/23/2019) for Recheck (keep anuja'd 8/13/19).           Huong Romero, APRN, ACNP-BC, CHFN-BC

## 2019-07-23 NOTE — TELEPHONE ENCOUNTER
Caregiver called the office stating that [facility] would not give patient anymore Cardizem 360mg. I called [facility] and they stated that patient got a 90 days supply on 6/4/19 and it was to early to refill. Caregiver stated that they would look in the the house again for the bottle because he can not find it.

## 2019-07-23 NOTE — ASSESSMENT & PLAN NOTE
Paroxysmal, in sinus rhythm at [facility] ER yesterday per EKG. [place] extended Holter pending review. Anticoagulated with Xarelto. On amiodarone. Scheduled to see Dr. Watters for possible ablation 9/2019.

## 2019-07-24 NOTE — TELEPHONE ENCOUNTER
----- Message from SABINO Baeza sent at 7/23/2019  5:58 PM CDT -----  Pt/friend were supposed to call with accurate medication list as soon as he returned home today so I could make sure he was taking all of his medication properly. Please call him and get an accurate med list for me to review. TX!      I just spoke with patient and updated his list he is no longer taking the Lexapro and I added Xarelto 20mg to his list.

## 2019-07-25 ENCOUNTER — TELEPHONE (OUTPATIENT)
Dept: CARDIOLOGY | Facility: CLINIC | Age: 79
End: 2019-07-25

## 2019-07-26 ENCOUNTER — TELEPHONE (OUTPATIENT)
Dept: CARDIOLOGY | Facility: CLINIC | Age: 79
End: 2019-07-26

## 2019-07-31 DIAGNOSIS — R40.0 UNCONTROLLED DAYTIME SOMNOLENCE: ICD-10-CM

## 2019-07-31 DIAGNOSIS — R06.00 PND (PAROXYSMAL NOCTURNAL DYSPNEA): ICD-10-CM

## 2019-07-31 DIAGNOSIS — I48.0 PAROXYSMAL ATRIAL FIBRILLATION (HCC): Chronic | ICD-10-CM

## 2019-07-31 DIAGNOSIS — J41.0 SIMPLE CHRONIC BRONCHITIS (HCC): Chronic | ICD-10-CM

## 2019-07-31 DIAGNOSIS — Z91.89 AT RISK FOR OBSTRUCTIVE SLEEP APNEA: ICD-10-CM

## 2019-07-31 DIAGNOSIS — I10 ESSENTIAL HYPERTENSION: Chronic | ICD-10-CM

## 2019-07-31 DIAGNOSIS — R06.02 SHORTNESS OF BREATH: ICD-10-CM

## 2019-08-01 ENCOUNTER — TELEPHONE (OUTPATIENT)
Dept: CARDIOLOGY | Facility: CLINIC | Age: 79
End: 2019-08-01

## 2019-08-01 NOTE — TELEPHONE ENCOUNTER
----- Message from Huong Rajan PA-C sent at 7/27/2019 12:13 PM CDT -----  Please let pt know of benign monitor.  Few episodes of AF.  Keep f/u with EP.  Thanks!      Called left message on machine for pt to call back for holter monitor results

## 2019-08-01 NOTE — TELEPHONE ENCOUNTER
CALLED WANTING HIS SLEEP STUDY RESULTS. TOLD HIM HE WOULD GET A CALL BACK WHEN THE RESULTS WHERE IN .

## 2019-08-02 ENCOUNTER — TELEPHONE (OUTPATIENT)
Dept: CARDIOLOGY | Facility: CLINIC | Age: 79
End: 2019-08-02

## 2019-08-02 DIAGNOSIS — G47.30 SLEEP-DISORDERED BREATHING: ICD-10-CM

## 2019-08-02 DIAGNOSIS — Z91.89 AT RISK FOR OBSTRUCTIVE SLEEP APNEA: ICD-10-CM

## 2019-08-02 DIAGNOSIS — G47.34 NOCTURNAL HYPOXIA: Primary | ICD-10-CM

## 2019-08-02 NOTE — TELEPHONE ENCOUNTER
Advise patient of DANYA that suggest sleep apnea and he needs to wear oxygen at night or anytime asleep, recheck DANYA and referral to Dr. Santamaria. Advise pt we will call him with appt date and time .
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
## 2019-08-13 NOTE — ASSESSMENT & PLAN NOTE
Uncontrolled. Eating [employer] frozen dinners for lowest sodium content. To check BP at home and call readings in 2 weeks. May need to change benazepril to ARB. On max tolerated diltiazem and metoprolol. Encouraged gradually increase routine aerobic activity.

## 2019-08-13 NOTE — ASSESSMENT & PLAN NOTE
Paroxysmal. Anticoagulated with Xarelto. Constipation with diltiazem. On amiodarone and metoprolol for rate and rhythm control with some afib and SVT (possible nonsustained VT) on extended Holter. Scheduled to see Dr. Watters next month to see if needs an ablation.

## 2020-01-01 ENCOUNTER — TELEPHONE (OUTPATIENT)
Dept: CARDIOLOGY | Facility: CLINIC | Age: 80
End: 2020-01-01

## 2020-01-01 DIAGNOSIS — I48.0 PAROXYSMAL ATRIAL FIBRILLATION (HCC): Chronic | ICD-10-CM

## 2020-01-01 DIAGNOSIS — I48.0 PAROXYSMAL ATRIAL FIBRILLATION (HCC): ICD-10-CM

## 2020-01-01 RX ORDER — AMIODARONE HYDROCHLORIDE 200 MG/1
200 TABLET ORAL DAILY
Qty: 90 TABLET | Refills: 3 | Status: SHIPPED | OUTPATIENT
Start: 2020-01-01 | End: 2021-05-18

## 2020-01-01 RX ORDER — DILTIAZEM HYDROCHLORIDE 360 MG/1
360 CAPSULE, EXTENDED RELEASE ORAL DAILY
Qty: 90 CAPSULE | Refills: 3 | Status: SHIPPED | OUTPATIENT
Start: 2020-01-01 | End: 2021-06-15

## 2020-02-24 NOTE — TELEPHONE ENCOUNTER
----- Message from SABINO Baeza sent at 2/21/2020  3:43 PM CST -----  Regarding: hospital f/u appt  Please schedule a 3-6 month hospital follow-up appt. [place]!        Pt has been scheduled and called.